# Patient Record
Sex: FEMALE | Race: WHITE | NOT HISPANIC OR LATINO | Employment: OTHER | URBAN - METROPOLITAN AREA
[De-identification: names, ages, dates, MRNs, and addresses within clinical notes are randomized per-mention and may not be internally consistent; named-entity substitution may affect disease eponyms.]

---

## 2017-11-21 PROCEDURE — 82607 VITAMIN B-12: CPT | Performed by: INTERNAL MEDICINE

## 2018-10-12 PROCEDURE — 88175 CYTOPATH C/V AUTO FLUID REDO: CPT | Performed by: INTERNAL MEDICINE

## 2020-06-29 ENCOUNTER — HOSPITAL ENCOUNTER (EMERGENCY)
Age: 65
Discharge: HOME | End: 2020-06-29
Attending: EMERGENCY MEDICINE

## 2020-06-29 ENCOUNTER — APPOINTMENT (OUTPATIENT)
Dept: GENERAL RADIOLOGY | Age: 65
End: 2020-06-29
Attending: EMERGENCY MEDICINE

## 2020-06-29 VITALS
SYSTOLIC BLOOD PRESSURE: 116 MMHG | RESPIRATION RATE: 16 BRPM | WEIGHT: 124.12 LBS | TEMPERATURE: 98.7 F | DIASTOLIC BLOOD PRESSURE: 70 MMHG | OXYGEN SATURATION: 95 % | HEART RATE: 66 BPM

## 2020-06-29 DIAGNOSIS — S92.514A CLOSED NONDISPLACED FRACTURE OF PROXIMAL PHALANX OF LESSER TOE OF RIGHT FOOT, INITIAL ENCOUNTER: Primary | ICD-10-CM

## 2020-06-29 PROCEDURE — 10002803 HB RX 637: Performed by: EMERGENCY MEDICINE

## 2020-06-29 PROCEDURE — 73630 X-RAY EXAM OF FOOT: CPT

## 2020-06-29 PROCEDURE — 99283 EMERGENCY DEPT VISIT LOW MDM: CPT

## 2020-06-29 RX ORDER — LEVOTHYROXINE SODIUM 137 UG/1
TABLET ORAL
COMMUNITY
Start: 2016-12-14

## 2020-06-29 RX ORDER — HYDROCODONE BITARTRATE AND ACETAMINOPHEN 10; 325 MG/1; MG/1
1 TABLET ORAL EVERY 6 HOURS PRN
Qty: 20 TABLET | Refills: 0 | Status: SHIPPED | OUTPATIENT
Start: 2020-06-29

## 2020-06-29 RX ORDER — IBUPROFEN 600 MG/1
600 TABLET ORAL ONCE
Status: COMPLETED | OUTPATIENT
Start: 2020-06-29 | End: 2020-06-29

## 2020-06-29 RX ORDER — HYDROCODONE BITARTRATE AND ACETAMINOPHEN 10; 325 MG/1; MG/1
1 TABLET ORAL ONCE
Status: COMPLETED | OUTPATIENT
Start: 2020-06-29 | End: 2020-06-29

## 2020-06-29 RX ORDER — ERGOCALCIFEROL 1.25 MG/1
CAPSULE ORAL
COMMUNITY
Start: 2020-06-19

## 2020-06-29 RX ADMIN — HYDROCODONE BITARTRATE AND ACETAMINOPHEN 1 TABLET: 10; 325 TABLET ORAL at 03:09

## 2020-06-29 RX ADMIN — IBUPROFEN 600 MG: 600 TABLET ORAL at 03:09

## 2021-03-25 ENCOUNTER — HOSPITAL ENCOUNTER (OUTPATIENT)
Age: 66
Discharge: HOME | End: 2021-03-25
Attending: EMERGENCY MEDICINE
Payer: COMMERCIAL

## 2021-03-25 ENCOUNTER — APPOINTMENT (OUTPATIENT)
Dept: GENERAL RADIOLOGY | Age: 66
End: 2021-03-25
Attending: EMERGENCY MEDICINE
Payer: COMMERCIAL

## 2021-03-25 VITALS
DIASTOLIC BLOOD PRESSURE: 61 MMHG | HEART RATE: 73 BPM | OXYGEN SATURATION: 99 % | TEMPERATURE: 98 F | RESPIRATION RATE: 18 BRPM | SYSTOLIC BLOOD PRESSURE: 127 MMHG

## 2021-03-25 DIAGNOSIS — S62.303A CLOSED NONDISPLACED FRACTURE OF THIRD METACARPAL BONE OF LEFT HAND, UNSPECIFIED PORTION OF METACARPAL, INITIAL ENCOUNTER: Primary | ICD-10-CM

## 2021-03-25 PROCEDURE — 29125 APPL SHORT ARM SPLINT STATIC: CPT

## 2021-03-25 PROCEDURE — 99203 OFFICE O/P NEW LOW 30 MIN: CPT

## 2021-03-25 PROCEDURE — 99214 OFFICE O/P EST MOD 30 MIN: CPT

## 2021-03-25 PROCEDURE — 73130 X-RAY EXAM OF HAND: CPT | Performed by: EMERGENCY MEDICINE

## 2021-03-25 NOTE — ED INITIAL ASSESSMENT (HPI)
Slipped outside while raining and fell injuring left hand 2 days ago. Swelling and bruising to left dorsal hand. + distal CMS.

## 2021-03-28 NOTE — ED PROVIDER NOTES
Patient Seen in: [facility]      History   Patient presents with:  Fall  Hand Injury    Stated Complaint: fell on left hand    HPI/Subjective:   HPI    71 yo female slipped and fell two days prior landing on her left hand.  Presents with [facility] Conjunctiva/sclera: Conjunctivae normal.      Pupils: Pupils are equal, round, and reactive to light. Cardiovascular:      Rate and Rhythm: Normal rate and regular rhythm. Pulmonary:      Effort: Pulmonary effort is normal. No respiratory distress.    Brendon Medal

## 2021-03-29 ENCOUNTER — OFFICE VISIT (OUTPATIENT)
Dept: SURGERY | Facility: CLINIC | Age: 66
End: 2021-03-29
Payer: COMMERCIAL

## 2021-03-29 ENCOUNTER — OFFICE VISIT (OUTPATIENT)
Dept: SURGERY | Facility: CLINIC | Age: 66
End: 2021-03-29

## 2021-03-29 DIAGNOSIS — M62.81 DISTAL MUSCLE WEAKNESS: ICD-10-CM

## 2021-03-29 DIAGNOSIS — M25.642 STIFFNESS OF JOINT, HAND, LEFT: Primary | ICD-10-CM

## 2021-03-29 DIAGNOSIS — S62.353A NONDISPLACED FRACTURE OF SHAFT OF THIRD METACARPAL BONE, LEFT HAND, INITIAL ENCOUNTER FOR CLOSED FRACTURE: Primary | ICD-10-CM

## 2021-03-29 PROCEDURE — 29125 APPL SHORT ARM SPLINT STATIC: CPT | Performed by: OCCUPATIONAL THERAPIST

## 2021-03-29 PROCEDURE — 99243 OFF/OP CNSLTJ NEW/EST LOW 30: CPT | Performed by: PLASTIC SURGERY

## 2021-03-29 NOTE — H&P
Injury 1: Nondisplaced metacarpal fracture LMF,from fall  - Date: 03/23/21  - Days Since: 6  Page Tavo Smith is a 72year old female that presents with Patient presents with: Injury: Nondisplaced metacarpal frracture of LMF  .     REFERRED BY:  Hunter Loco Me Laterality Date   • CYST ASPIRATION LEFT  2014   • RIGHT PHACOEMULSIFICATION OF CATARACT WITH INTRAOCULAR LENS IMPLANT 28050 Right 3/19/2008    Performed by Hortencia Simental MD at [address] [place] [address]   • RIGHT PHACOEMULSIFICATION OF CATARACT WITH INTRA Palpation - Normal, Thyroid gland - Normal, No adenopathy  RESPIRATORY: Inspection - Normal, Effort - Normal  CARDIOVASCULAR: Regular rhythm, No murmurs  ABDOMEN: Inspection - Normal, No abdominal tenderness  NEURO: Memory intact  PSYCH: Oriented to person

## 2021-03-30 NOTE — PROGRESS NOTES
Subjective: I hurt my left hand:      Objective:     Current level of performance:  ADL: Independent  Work: No work involving the left hand  Leisure: Not addressed    Measurements/Tests:  ROM:         N/A         Treatment Provided this day: Fabricated a l

## 2021-04-20 ENCOUNTER — APPOINTMENT (OUTPATIENT)
Dept: SURGERY | Facility: CLINIC | Age: 66
End: 2021-04-20
Payer: COMMERCIAL

## 2021-04-20 DIAGNOSIS — M62.81 DISTAL MUSCLE WEAKNESS: ICD-10-CM

## 2021-04-20 DIAGNOSIS — M25.642 STIFFNESS OF JOINT, HAND, LEFT: Primary | ICD-10-CM

## 2021-04-20 PROCEDURE — 97166 OT EVAL MOD COMPLEX 45 MIN: CPT | Performed by: OCCUPATIONAL THERAPIST

## 2021-04-20 PROCEDURE — 97110 THERAPEUTIC EXERCISES: CPT | Performed by: OCCUPATIONAL THERAPIST

## 2021-04-22 NOTE — PROGRESS NOTES
OCCUPATIONAL THERAPY EVALUATION:   Talat Vang March   [id number]       SUBJECTIVE:    HX of Injury: Left middle finger MC shaft fracture, non-displaced. Chief Complaint:   No complaints.     Precautions: None  Premorbid Functional Status: Independent with s

## 2021-04-26 ENCOUNTER — OFFICE VISIT (OUTPATIENT)
Dept: SURGERY | Facility: CLINIC | Age: 66
End: 2021-04-26
Payer: COMMERCIAL

## 2021-04-26 DIAGNOSIS — M25.642 STIFFNESS OF JOINT, HAND, LEFT: Primary | ICD-10-CM

## 2021-04-26 DIAGNOSIS — M62.81 DISTAL MUSCLE WEAKNESS: ICD-10-CM

## 2021-04-26 DIAGNOSIS — S62.353A NONDISPLACED FRACTURE OF SHAFT OF THIRD METACARPAL BONE, LEFT HAND, INITIAL ENCOUNTER FOR CLOSED FRACTURE: Primary | ICD-10-CM

## 2021-04-26 PROCEDURE — 99213 OFFICE O/P EST LOW 20 MIN: CPT | Performed by: PLASTIC SURGERY

## 2021-04-26 PROCEDURE — 97110 THERAPEUTIC EXERCISES: CPT | Performed by: OCCUPATIONAL THERAPIST

## 2021-04-26 RX ORDER — POLYETHYLENE GLYCOL 3350, SODIUM SULFATE ANHYDROUS, SODIUM BICARBONATE, SODIUM CHLORIDE, POTASSIUM CHLORIDE 236; 22.74; 6.74; 5.86; 2.97 G/4L; G/4L; G/4L; G/4L; G/4L
POWDER, FOR SOLUTION ORAL
COMMUNITY
Start: 2021-01-14

## 2021-04-26 NOTE — PROGRESS NOTES
Subjective: I am well. Objective:     Current level of performance:  ADL: Independent  Work: No restrictions.   Leisure: Not addressed    Measurements/Tests:  ROM:  Testing By: mateo   Strength Right: 40 #      Strength Left: 30 #      Treatment

## 2021-04-26 NOTE — PROGRESS NOTES
Injury 1: LMF MC shaft fracture, nondisplaced  - Date: 03/23/21  - Days Since: 34   \"Doing better\"  Denies pain,numbness,tingling and need for analgesics. 34 days post injury  No complaints. No pain.     Full range of motion  Strength 35 versus 40  Fr

## 2021-05-20 PROCEDURE — 88305 TISSUE EXAM BY PATHOLOGIST: CPT | Performed by: INTERNAL MEDICINE

## 2021-06-12 ENCOUNTER — APPOINTMENT (OUTPATIENT)
Dept: GENERAL RADIOLOGY | Age: 66
End: 2021-06-12
Attending: EMERGENCY MEDICINE
Payer: COMMERCIAL

## 2021-06-12 ENCOUNTER — HOSPITAL ENCOUNTER (OUTPATIENT)
Age: 66
Discharge: HOME | End: 2021-06-12
Attending: EMERGENCY MEDICINE
Payer: COMMERCIAL

## 2021-06-12 VITALS
RESPIRATION RATE: 20 BRPM | HEART RATE: 68 BPM | SYSTOLIC BLOOD PRESSURE: 141 MMHG | OXYGEN SATURATION: 96 % | TEMPERATURE: 98 F | DIASTOLIC BLOOD PRESSURE: 73 MMHG

## 2021-06-12 DIAGNOSIS — S23.41XA RIB SPRAIN, INITIAL ENCOUNTER: Primary | ICD-10-CM

## 2021-06-12 PROCEDURE — 99213 OFFICE O/P EST LOW 20 MIN: CPT

## 2021-06-12 PROCEDURE — 71101 X-RAY EXAM UNILAT RIBS/CHEST: CPT | Performed by: EMERGENCY MEDICINE

## 2021-06-12 RX ORDER — HYDROCODONE BITARTRATE AND ACETAMINOPHEN 5; 325 MG/1; MG/1
1 TABLET ORAL EVERY 8 HOURS PRN
Qty: 10 TABLET | Refills: 0 | Status: SHIPPED | OUTPATIENT
Start: 2021-06-12 | End: 2021-06-19

## 2021-06-12 RX ORDER — LIDOCAINE 50 MG/G
1 PATCH TOPICAL EVERY 24 HOURS
Qty: 6 PATCH | Refills: 0 | Status: SHIPPED | OUTPATIENT
Start: 2021-06-12 | End: 2021-06-18

## 2021-06-12 NOTE — ED PROVIDER NOTES
Patient Seen in: [facility]      History   Patient presents with:  Trauma    Stated Complaint: left sifde rib pain    HPI/Subjective:   HPI    The patient is a 63-year-old female with past history of hypertension, Graves' disease who presents systems reviewed and negative except as noted above.     Physical Exam     ED Triage Vitals [06/12/21 1509]   /73   Pulse 68   Resp 20   Temp 98 °F (36.7 °C)   Temp src Temporal   SpO2 96 %   O2 Device None (Room air)       Current:/73   Pulse 6 Disposition:  Discharge  6/12/2021  3:31 pm    Follow-up:  Tri Gonzalez MD  [address] [facility]  [phone]    In 3 days  If symptoms worsen          Medications Prescribed:  Discharge Medication List as of 6/12/20

## 2021-06-12 NOTE — ED INITIAL ASSESSMENT (HPI)
Left rib cage pain since Sunday, states was leaning over cleaning her hot tub when she heard a pop, no cp, no sob

## 2021-09-23 NOTE — LETTER
21      Patient: Drea Rios March  : 10/22/1955 Visit date: 2021    Dear Jerilyn Tay,      I examined your patient in follow-up today. She is 5 weeks post left middle finger metacarpal shaft fracture.   She was treated with a sp Katya [facility]ianne Diaz [facility]

## 2021-12-07 ENCOUNTER — HOSPITAL ENCOUNTER (OUTPATIENT)
Age: 66
Discharge: HOME | End: 2021-12-07
Payer: COMMERCIAL

## 2021-12-07 ENCOUNTER — APPOINTMENT (OUTPATIENT)
Dept: GENERAL RADIOLOGY | Age: 66
End: 2021-12-07
Attending: NURSE PRACTITIONER
Payer: COMMERCIAL

## 2021-12-07 VITALS
SYSTOLIC BLOOD PRESSURE: 138 MMHG | OXYGEN SATURATION: 99 % | DIASTOLIC BLOOD PRESSURE: 87 MMHG | HEART RATE: 74 BPM | RESPIRATION RATE: 18 BRPM | TEMPERATURE: 98 F

## 2021-12-07 DIAGNOSIS — J06.9 UPPER RESPIRATORY TRACT INFECTION, UNSPECIFIED TYPE: ICD-10-CM

## 2021-12-07 DIAGNOSIS — J02.0 STREP PHARYNGITIS: Primary | ICD-10-CM

## 2021-12-07 PROCEDURE — 99213 OFFICE O/P EST LOW 20 MIN: CPT

## 2021-12-07 PROCEDURE — 71046 X-RAY EXAM CHEST 2 VIEWS: CPT | Performed by: NURSE PRACTITIONER

## 2021-12-07 PROCEDURE — 94640 AIRWAY INHALATION TREATMENT: CPT

## 2021-12-07 PROCEDURE — 87880 STREP A ASSAY W/OPTIC: CPT

## 2021-12-07 PROCEDURE — 99214 OFFICE O/P EST MOD 30 MIN: CPT

## 2021-12-07 RX ORDER — PREDNISONE 20 MG/1
40 TABLET ORAL DAILY
Qty: 10 TABLET | Refills: 0 | Status: SHIPPED | OUTPATIENT
Start: 2021-12-07 | End: 2021-12-12

## 2021-12-07 RX ORDER — ALBUTEROL SULFATE 90 UG/1
4 AEROSOL, METERED RESPIRATORY (INHALATION) 4 TIMES DAILY
Status: DISCONTINUED | OUTPATIENT
Start: 2021-12-07 | End: 2021-12-07

## 2021-12-07 RX ORDER — CODEINE PHOSPHATE AND GUAIFENESIN 10; 100 MG/5ML; MG/5ML
5 SOLUTION ORAL EVERY 6 HOURS PRN
Qty: 118 ML | Refills: 0 | Status: SHIPPED | OUTPATIENT
Start: 2021-12-07

## 2021-12-07 RX ORDER — AZITHROMYCIN 250 MG/1
TABLET, FILM COATED ORAL
Qty: 6 TABLET | Refills: 0 | Status: SHIPPED | OUTPATIENT
Start: 2021-12-07 | End: 2021-12-12

## 2021-12-07 NOTE — ED INITIAL ASSESSMENT (HPI)
Pt c/o bilateral ear pain, nasal congestion and cough x 6 days, pt states she recently arrived from a cruise.  States she has had multiple negative covid test.

## 2021-12-07 NOTE — ED PROVIDER NOTES
Patient Seen in: [facility]      History   Patient presents with:  Cough/URI    Stated Complaint: sore throat, ear ache, chest pain when coughing    Subjective:   HPI    49-year-old female, with history of Graves' disease, reflux and high cho (36.8 °C) (Temporal)   Resp 18   SpO2 99%         Physical Exam  Vitals and nursing note reviewed. Constitutional:       Appearance: Normal appearance. She is well-developed. HENT:      Head: Normocephalic.       Right Ear: Tympanic membrane normal. [id number]  [phone]    Schedule an appointment as soon as possible for a visit in 2 days  If symptoms worsen go to the ER          Medications Prescribed:  Current Discharge Medication List    START taking these medications    [facility]

## 2022-01-08 ENCOUNTER — HOSPITAL ENCOUNTER (OUTPATIENT)
Age: 67
Discharge: HOME | End: 2022-01-08
Attending: EMERGENCY MEDICINE
Payer: COMMERCIAL

## 2022-01-08 ENCOUNTER — APPOINTMENT (OUTPATIENT)
Dept: GENERAL RADIOLOGY | Age: 67
End: 2022-01-08
Attending: EMERGENCY MEDICINE
Payer: COMMERCIAL

## 2022-01-08 VITALS
TEMPERATURE: 98 F | SYSTOLIC BLOOD PRESSURE: 156 MMHG | HEART RATE: 90 BPM | DIASTOLIC BLOOD PRESSURE: 68 MMHG | RESPIRATION RATE: 20 BRPM | OXYGEN SATURATION: 99 %

## 2022-01-08 DIAGNOSIS — S92.335A NONDISPLACED FRACTURE OF THIRD METATARSAL BONE, LEFT FOOT, INITIAL ENCOUNTER FOR CLOSED FRACTURE: Primary | ICD-10-CM

## 2022-01-08 PROCEDURE — 99214 OFFICE O/P EST MOD 30 MIN: CPT

## 2022-01-08 PROCEDURE — 99213 OFFICE O/P EST LOW 20 MIN: CPT

## 2022-01-08 PROCEDURE — 73630 X-RAY EXAM OF FOOT: CPT | Performed by: EMERGENCY MEDICINE

## 2022-01-08 PROCEDURE — 29515 APPLICATION SHORT LEG SPLINT: CPT

## 2022-01-08 NOTE — ED INITIAL ASSESSMENT (HPI)
Missed a step going down basement stairs yesterday injuring left foot. Pain, swelling, and bruising noted to dorsal foot. Painful weight bearing. + distal CMS.

## 2022-01-08 NOTE — ED PROVIDER NOTES
Patient Seen in: [facility]      History   Patient presents with:   Foot Injury    Stated Complaint: injured left foot during fall    Subjective:   HPI    The patient is a 31-year-old female with no significant past medical history presents no place in short leg OCL and provide with walker    Procedure note: Splint check  After placement of a short leg OCL by the medical assistant, the proper joint is immobilized. The left foot is neurovascularly intact.      Patient is a [facility]note 2[facility]

## 2022-10-19 ENCOUNTER — HOSPITAL ENCOUNTER (OUTPATIENT)
Age: 67
Discharge: HOME | End: 2022-10-19
Payer: COMMERCIAL

## 2022-10-19 VITALS
SYSTOLIC BLOOD PRESSURE: 124 MMHG | HEART RATE: 70 BPM | TEMPERATURE: 98 F | OXYGEN SATURATION: 100 % | DIASTOLIC BLOOD PRESSURE: 60 MMHG | RESPIRATION RATE: 18 BRPM

## 2022-10-19 DIAGNOSIS — M54.42 ACUTE LEFT-SIDED LOW BACK PAIN WITH LEFT-SIDED SCIATICA: Primary | ICD-10-CM

## 2022-10-19 PROCEDURE — 99214 OFFICE O/P EST MOD 30 MIN: CPT

## 2022-10-19 PROCEDURE — 99213 OFFICE O/P EST LOW 20 MIN: CPT

## 2022-10-19 PROCEDURE — 96372 THER/PROPH/DIAG INJ SC/IM: CPT

## 2022-10-19 RX ORDER — NAPROXEN 500 MG/1
500 TABLET ORAL 2 TIMES DAILY PRN
Qty: 10 TABLET | Refills: 0 | Status: SHIPPED | OUTPATIENT
Start: 2022-10-19 | End: 2022-10-24

## 2022-10-19 RX ORDER — CYCLOBENZAPRINE HCL 10 MG
10 TABLET ORAL ONCE
Status: COMPLETED | OUTPATIENT
Start: 2022-10-19 | End: 2022-10-19

## 2022-10-19 RX ORDER — METHYLPREDNISOLONE 4 MG/1
TABLET ORAL
Qty: 21 TABLET | Refills: 0 | Status: SHIPPED | OUTPATIENT
Start: 2022-10-19

## 2022-10-19 RX ORDER — CYCLOBENZAPRINE HCL 10 MG
10 TABLET ORAL 3 TIMES DAILY PRN
Qty: 14 TABLET | Refills: 0 | Status: SHIPPED | OUTPATIENT
Start: 2022-10-19 | End: 2022-10-26

## 2022-10-19 RX ORDER — LIDOCAINE 50 MG/G
1 PATCH TOPICAL EVERY 24 HOURS
Qty: 5 PATCH | Refills: 0 | Status: SHIPPED | OUTPATIENT
Start: 2022-10-19 | End: 2022-10-24

## 2022-10-19 RX ORDER — KETOROLAC TROMETHAMINE 30 MG/ML
30 INJECTION, SOLUTION INTRAMUSCULAR; INTRAVENOUS ONCE
Status: COMPLETED | OUTPATIENT
Start: 2022-10-19 | End: 2022-10-19

## 2022-10-19 NOTE — ED INITIAL ASSESSMENT (HPI)
Atraumatic left lower back pain radiating to left leg since yesterday, no numbness or tingling, no bowel or bladder incontinence, h/o herniated discs, no urinary symptoms.  No rash

## 2023-07-14 ENCOUNTER — APPOINTMENT (OUTPATIENT)
Dept: GENERAL RADIOLOGY | Age: 68
End: 2023-07-14
Attending: NURSE PRACTITIONER
Payer: MEDICARE

## 2023-07-14 ENCOUNTER — TELEPHONE (OUTPATIENT)
Dept: ORTHOPEDICS CLINIC | Facility: CLINIC | Age: 68
End: 2023-07-14

## 2023-07-14 ENCOUNTER — HOSPITAL ENCOUNTER (OUTPATIENT)
Age: 68
Discharge: HOME | End: 2023-07-14
Payer: MEDICARE

## 2023-07-14 VITALS
TEMPERATURE: 97 F | OXYGEN SATURATION: 100 % | RESPIRATION RATE: 20 BRPM | DIASTOLIC BLOOD PRESSURE: 65 MMHG | SYSTOLIC BLOOD PRESSURE: 153 MMHG | HEART RATE: 62 BPM

## 2023-07-14 DIAGNOSIS — S52.552A OTHER CLOSED EXTRA-ARTICULAR FRACTURE OF DISTAL END OF LEFT RADIUS, INITIAL ENCOUNTER: Primary | ICD-10-CM

## 2023-07-14 DIAGNOSIS — M25.532 LEFT WRIST PAIN: Primary | ICD-10-CM

## 2023-07-14 PROCEDURE — 99214 OFFICE O/P EST MOD 30 MIN: CPT

## 2023-07-14 PROCEDURE — 29125 APPL SHORT ARM SPLINT STATIC: CPT

## 2023-07-14 PROCEDURE — 73130 X-RAY EXAM OF HAND: CPT | Performed by: NURSE PRACTITIONER

## 2023-07-14 PROCEDURE — 73110 X-RAY EXAM OF WRIST: CPT | Performed by: NURSE PRACTITIONER

## 2023-07-14 NOTE — TELEPHONE ENCOUNTER
How soon should patient be seen and when? DOI:7/14/23  CONCLUSION:   1.  Acute nondisplaced fracture distal radius dorsal cortical margin with intra-articular extension

## 2023-07-14 NOTE — DISCHARGE INSTRUCTIONS
Please ice and elevate the extremity. Please take Motrin or Tylenol for pain. Follow-up with orthopedics. I would like you to call today to schedule a follow-up appointment.

## 2023-07-14 NOTE — TELEPHONE ENCOUNTER
Patient was seen in [address] [place] [address] today and is requesting appt for Lt wrist fx, xrays are in epic for review. Please advise if/ when we are able to treat this patient, patient request if possible she would be able to come to the [facility].

## 2023-07-17 NOTE — TELEPHONE ENCOUNTER
DOMO Gonzalez  [facility] Clinical Pool; Chastity Paulson MD2 minutes ago (10:47 AM)       Sure, team can we call and offer appointment tomorrow? Thanks! Please see Sincer's message requesting the patient be added to his schedule tomorrow. Thank you!

## 2023-07-17 NOTE — TELEPHONE ENCOUNTER
Scheduled. Please advise if additional imaging is needed.    Future Appointments   Date Time Provider Emilia Madden   7/18/2023  1:40 PM DOMO Velasquez [id number]

## 2023-07-17 NOTE — TELEPHONE ENCOUNTER
X-Ray ordered. Please schedule with the patient.  Thank you    - previous images greater than 72 hours

## 2023-07-18 ENCOUNTER — OFFICE VISIT (OUTPATIENT)
Dept: ORTHOPEDICS CLINIC | Facility: CLINIC | Age: 68
End: 2023-07-18
Payer: MEDICARE

## 2023-07-18 ENCOUNTER — HOSPITAL ENCOUNTER (OUTPATIENT)
Dept: GENERAL RADIOLOGY | Age: 68
Discharge: HOME | End: 2023-07-18
Attending: ORTHOPAEDIC SURGERY
Payer: MEDICARE

## 2023-07-18 VITALS — BODY MASS INDEX: 22.08 KG/M2 | WEIGHT: 120 LBS | HEIGHT: 62 IN

## 2023-07-18 DIAGNOSIS — M25.532 LEFT WRIST PAIN: ICD-10-CM

## 2023-07-18 DIAGNOSIS — S52.592A OTHER CLOSED FRACTURE OF DISTAL END OF LEFT RADIUS, INITIAL ENCOUNTER: Primary | ICD-10-CM

## 2023-07-18 PROCEDURE — 1159F MED LIST DOCD IN RCRD: CPT | Performed by: PHYSICIAN ASSISTANT

## 2023-07-18 PROCEDURE — 3008F BODY MASS INDEX DOCD: CPT | Performed by: PHYSICIAN ASSISTANT

## 2023-07-18 PROCEDURE — 99203 OFFICE O/P NEW LOW 30 MIN: CPT | Performed by: PHYSICIAN ASSISTANT

## 2023-07-18 PROCEDURE — 1125F AMNT PAIN NOTED PAIN PRSNT: CPT | Performed by: PHYSICIAN ASSISTANT

## 2023-07-18 PROCEDURE — 1160F RVW MEDS BY RX/DR IN RCRD: CPT | Performed by: PHYSICIAN ASSISTANT

## 2023-08-29 ENCOUNTER — OFFICE VISIT (OUTPATIENT)
Dept: ORTHOPEDICS CLINIC | Facility: CLINIC | Age: 68
End: 2023-08-29
Payer: MEDICARE

## 2023-08-29 DIAGNOSIS — M25.632 STIFFNESS OF LEFT WRIST JOINT: Primary | ICD-10-CM

## 2023-08-29 DIAGNOSIS — S52.592D OTHER CLOSED FRACTURE OF DISTAL END OF LEFT RADIUS WITH ROUTINE HEALING, SUBSEQUENT ENCOUNTER: ICD-10-CM

## 2023-08-29 PROCEDURE — 1160F RVW MEDS BY RX/DR IN RCRD: CPT | Performed by: PHYSICIAN ASSISTANT

## 2023-08-29 PROCEDURE — 99213 OFFICE O/P EST LOW 20 MIN: CPT | Performed by: PHYSICIAN ASSISTANT

## 2023-08-29 PROCEDURE — 1159F MED LIST DOCD IN RCRD: CPT | Performed by: PHYSICIAN ASSISTANT

## 2023-08-29 PROCEDURE — 1126F AMNT PAIN NOTED NONE PRSNT: CPT | Performed by: PHYSICIAN ASSISTANT

## 2023-09-06 ENCOUNTER — TELEPHONE (OUTPATIENT)
Dept: PHYSICAL THERAPY | Facility: HOSPITAL | Age: 68
End: 2023-09-06

## 2023-09-07 ENCOUNTER — OFFICE VISIT (OUTPATIENT)
Dept: PHYSICAL THERAPY | Age: 68
End: 2023-09-07
Attending: PHYSICIAN ASSISTANT
Payer: MEDICARE

## 2023-09-07 DIAGNOSIS — S52.592D OTHER CLOSED FRACTURE OF DISTAL END OF LEFT RADIUS WITH ROUTINE HEALING, SUBSEQUENT ENCOUNTER: ICD-10-CM

## 2023-09-07 DIAGNOSIS — M25.632 STIFFNESS OF LEFT WRIST JOINT: Primary | ICD-10-CM

## 2023-09-07 PROCEDURE — 97165 OT EVAL LOW COMPLEX 30 MIN: CPT

## 2023-09-07 PROCEDURE — 97110 THERAPEUTIC EXERCISES: CPT

## 2023-09-11 ENCOUNTER — OFFICE VISIT (OUTPATIENT)
Dept: PHYSICAL THERAPY | Age: 68
End: 2023-09-11
Attending: PHYSICIAN ASSISTANT
Payer: MEDICARE

## 2023-09-11 PROCEDURE — 97140 MANUAL THERAPY 1/> REGIONS: CPT

## 2023-09-11 PROCEDURE — 97110 THERAPEUTIC EXERCISES: CPT

## 2023-09-13 ENCOUNTER — APPOINTMENT (OUTPATIENT)
Dept: PHYSICAL THERAPY | Age: 68
End: 2023-09-13
Attending: PHYSICIAN ASSISTANT
Payer: MEDICARE

## 2023-09-18 ENCOUNTER — APPOINTMENT (OUTPATIENT)
Dept: PHYSICAL THERAPY | Age: 68
End: 2023-09-18
Attending: PHYSICIAN ASSISTANT
Payer: MEDICARE

## 2023-09-20 ENCOUNTER — OFFICE VISIT (OUTPATIENT)
Dept: PHYSICAL THERAPY | Age: 68
End: 2023-09-20
Attending: PHYSICIAN ASSISTANT
Payer: MEDICARE

## 2023-09-20 PROCEDURE — 97140 MANUAL THERAPY 1/> REGIONS: CPT

## 2023-09-20 PROCEDURE — 97110 THERAPEUTIC EXERCISES: CPT

## 2023-09-25 ENCOUNTER — OFFICE VISIT (OUTPATIENT)
Dept: PHYSICAL THERAPY | Age: 68
End: 2023-09-25
Attending: PHYSICIAN ASSISTANT
Payer: MEDICARE

## 2023-09-25 PROCEDURE — 97110 THERAPEUTIC EXERCISES: CPT

## 2023-09-25 PROCEDURE — 97140 MANUAL THERAPY 1/> REGIONS: CPT

## 2023-09-27 ENCOUNTER — OFFICE VISIT (OUTPATIENT)
Dept: PHYSICAL THERAPY | Age: 68
End: 2023-09-27
Attending: PHYSICIAN ASSISTANT
Payer: MEDICARE

## 2023-09-27 PROCEDURE — 97110 THERAPEUTIC EXERCISES: CPT

## 2023-09-27 PROCEDURE — 97140 MANUAL THERAPY 1/> REGIONS: CPT

## 2023-10-02 ENCOUNTER — OFFICE VISIT (OUTPATIENT)
Dept: PHYSICAL THERAPY | Age: 68
End: 2023-10-02
Attending: PHYSICIAN ASSISTANT
Payer: MEDICARE

## 2023-10-02 PROCEDURE — 97110 THERAPEUTIC EXERCISES: CPT

## 2024-10-21 ENCOUNTER — APPOINTMENT (RX ONLY)
Dept: URBAN - METROPOLITAN AREA CLINIC 156 | Facility: CLINIC | Age: 69
Setting detail: DERMATOLOGY
End: 2024-10-21

## 2024-10-21 DIAGNOSIS — R20.2 PARESTHESIA OF SKIN: ICD-10-CM | Status: INADEQUATELY CONTROLLED

## 2024-10-21 DIAGNOSIS — L82.1 OTHER SEBORRHEIC KERATOSIS: ICD-10-CM

## 2024-10-21 PROCEDURE — ? PRESCRIPTION

## 2024-10-21 PROCEDURE — 99204 OFFICE O/P NEW MOD 45 MIN: CPT

## 2024-10-21 PROCEDURE — ? COUNSELING

## 2024-10-21 RX ORDER — TRIAMCINOLONE ACETONIDE 1 MG/G
CREAM TOPICAL BID
Qty: 454 | Refills: 1 | Status: ERX | COMMUNITY
Start: 2024-10-21

## 2024-10-21 RX ADMIN — TRIAMCINOLONE ACETONIDE: 1 CREAM TOPICAL at 00:00

## 2024-10-21 ASSESSMENT — LOCATION DETAILED DESCRIPTION DERM: LOCATION DETAILED: LEFT INFERIOR UPPER BACK

## 2024-10-21 ASSESSMENT — LOCATION SIMPLE DESCRIPTION DERM: LOCATION SIMPLE: LEFT UPPER BACK

## 2024-10-21 ASSESSMENT — ITCH INTENSITY: HOW SEVERE IS YOUR ITCHING?: 9

## 2024-10-21 ASSESSMENT — LOCATION ZONE DERM: LOCATION ZONE: TRUNK

## (undated) NOTE — LETTER
21      Patient: Ace Olivera March  : 10/22/1955 Visit date: 3/29/2021    Dear Kalani Olson,      I examined your patient in consultation today.     She has a nondisplaced fracture of the shaft of the left middle finger metacarpal.  We ha